# Patient Record
Sex: MALE | Race: WHITE | Employment: FULL TIME | URBAN - METROPOLITAN AREA
[De-identification: names, ages, dates, MRNs, and addresses within clinical notes are randomized per-mention and may not be internally consistent; named-entity substitution may affect disease eponyms.]

---

## 2022-02-09 ENCOUNTER — HOSPITAL ENCOUNTER (EMERGENCY)
Age: 62
Discharge: HOME | End: 2022-02-09
Attending: EMERGENCY MEDICINE
Payer: COMMERCIAL

## 2022-02-09 VITALS
OXYGEN SATURATION: 94 % | HEIGHT: 68 IN | BODY MASS INDEX: 25.91 KG/M2 | HEART RATE: 80 BPM | RESPIRATION RATE: 12 BRPM | TEMPERATURE: 98.3 F | WEIGHT: 171 LBS | DIASTOLIC BLOOD PRESSURE: 52 MMHG | SYSTOLIC BLOOD PRESSURE: 126 MMHG

## 2022-02-09 DIAGNOSIS — T18.128A ESOPHAGEAL OBSTRUCTION DUE TO FOOD IMPACTION: Primary | ICD-10-CM

## 2022-02-09 DIAGNOSIS — T18.128A FOOD IMPACTION OF ESOPHAGUS, INITIAL ENCOUNTER: ICD-10-CM

## 2022-02-09 DIAGNOSIS — K22.2 ESOPHAGEAL OBSTRUCTION DUE TO FOOD IMPACTION: Primary | ICD-10-CM

## 2022-02-09 LAB
A/G RATIO: 1 (ref 1.1–2.2)
ALBUMIN SERPL-MCNC: 4 G/DL (ref 3.4–5)
ALP BLD-CCNC: 101 U/L (ref 40–129)
ALT SERPL-CCNC: 19 U/L (ref 10–40)
ANION GAP SERPL CALCULATED.3IONS-SCNC: 12 MMOL/L (ref 3–16)
AST SERPL-CCNC: 20 U/L (ref 15–37)
BASOPHILS ABSOLUTE: 0.1 K/UL (ref 0–0.2)
BASOPHILS RELATIVE PERCENT: 1 %
BILIRUB SERPL-MCNC: 0.3 MG/DL (ref 0–1)
BUN BLDV-MCNC: 10 MG/DL (ref 7–20)
CALCIUM SERPL-MCNC: 9.2 MG/DL (ref 8.3–10.6)
CHLORIDE BLD-SCNC: 99 MMOL/L (ref 99–110)
CO2: 25 MMOL/L (ref 21–32)
CREAT SERPL-MCNC: 0.9 MG/DL (ref 0.8–1.3)
EOSINOPHILS ABSOLUTE: 0.1 K/UL (ref 0–0.6)
EOSINOPHILS RELATIVE PERCENT: 1.8 %
GFR AFRICAN AMERICAN: >60
GFR NON-AFRICAN AMERICAN: >60
GLUCOSE BLD-MCNC: 110 MG/DL (ref 70–99)
HCT VFR BLD CALC: 44.3 % (ref 40.5–52.5)
HEMOGLOBIN: 14.8 G/DL (ref 13.5–17.5)
LYMPHOCYTES ABSOLUTE: 2.8 K/UL (ref 1–5.1)
LYMPHOCYTES RELATIVE PERCENT: 36.4 %
MCH RBC QN AUTO: 28.2 PG (ref 26–34)
MCHC RBC AUTO-ENTMCNC: 33.4 G/DL (ref 31–36)
MCV RBC AUTO: 84.4 FL (ref 80–100)
MONOCYTES ABSOLUTE: 0.8 K/UL (ref 0–1.3)
MONOCYTES RELATIVE PERCENT: 10.1 %
NEUTROPHILS ABSOLUTE: 4 K/UL (ref 1.7–7.7)
NEUTROPHILS RELATIVE PERCENT: 50.7 %
PDW BLD-RTO: 13.9 % (ref 12.4–15.4)
PLATELET # BLD: 267 K/UL (ref 135–450)
PMV BLD AUTO: 6.8 FL (ref 5–10.5)
POTASSIUM REFLEX MAGNESIUM: 4 MMOL/L (ref 3.5–5.1)
RBC # BLD: 5.25 M/UL (ref 4.2–5.9)
SODIUM BLD-SCNC: 136 MMOL/L (ref 136–145)
TOTAL PROTEIN: 8.2 G/DL (ref 6.4–8.2)
WBC # BLD: 7.8 K/UL (ref 4–11)

## 2022-02-09 PROCEDURE — 88342 IMHCHEM/IMCYTCHM 1ST ANTB: CPT

## 2022-02-09 PROCEDURE — 99152 MOD SED SAME PHYS/QHP 5/>YRS: CPT | Performed by: INTERNAL MEDICINE

## 2022-02-09 PROCEDURE — 88341 IMHCHEM/IMCYTCHM EA ADD ANTB: CPT

## 2022-02-09 PROCEDURE — 7100000010 HC PHASE II RECOVERY - FIRST 15 MIN: Performed by: INTERNAL MEDICINE

## 2022-02-09 PROCEDURE — 2709999900 HC NON-CHARGEABLE SUPPLY: Performed by: INTERNAL MEDICINE

## 2022-02-09 PROCEDURE — 88305 TISSUE EXAM BY PATHOLOGIST: CPT

## 2022-02-09 PROCEDURE — 99284 EMERGENCY DEPT VISIT MOD MDM: CPT

## 2022-02-09 PROCEDURE — 3609012400 HC EGD TRANSORAL BIOPSY SINGLE/MULTIPLE: Performed by: INTERNAL MEDICINE

## 2022-02-09 PROCEDURE — 3609012900 HC EGD FOREIGN BODY REMOVAL: Performed by: INTERNAL MEDICINE

## 2022-02-09 PROCEDURE — 2500000003 HC RX 250 WO HCPCS: Performed by: NURSE PRACTITIONER

## 2022-02-09 PROCEDURE — 2720000010 HC SURG SUPPLY STERILE: Performed by: INTERNAL MEDICINE

## 2022-02-09 PROCEDURE — 88312 SPECIAL STAINS GROUP 1: CPT

## 2022-02-09 PROCEDURE — 99153 MOD SED SAME PHYS/QHP EA: CPT | Performed by: INTERNAL MEDICINE

## 2022-02-09 PROCEDURE — 85025 COMPLETE CBC W/AUTO DIFF WBC: CPT

## 2022-02-09 PROCEDURE — 36415 COLL VENOUS BLD VENIPUNCTURE: CPT

## 2022-02-09 PROCEDURE — 96374 THER/PROPH/DIAG INJ IV PUSH: CPT

## 2022-02-09 PROCEDURE — 80053 COMPREHEN METABOLIC PANEL: CPT

## 2022-02-09 PROCEDURE — 6360000002 HC RX W HCPCS: Performed by: INTERNAL MEDICINE

## 2022-02-09 RX ORDER — FENTANYL CITRATE 50 UG/ML
INJECTION, SOLUTION INTRAMUSCULAR; INTRAVENOUS PRN
Status: DISCONTINUED | OUTPATIENT
Start: 2022-02-09 | End: 2022-02-09 | Stop reason: ALTCHOICE

## 2022-02-09 RX ORDER — DIPHENHYDRAMINE HYDROCHLORIDE 50 MG/ML
INJECTION INTRAMUSCULAR; INTRAVENOUS PRN
Status: DISCONTINUED | OUTPATIENT
Start: 2022-02-09 | End: 2022-02-09 | Stop reason: ALTCHOICE

## 2022-02-09 RX ORDER — PANTOPRAZOLE SODIUM 40 MG/1
40 TABLET, DELAYED RELEASE ORAL
Qty: 30 TABLET | Refills: 0 | Status: ON HOLD | OUTPATIENT
Start: 2022-02-09 | End: 2022-05-13 | Stop reason: SDUPTHER

## 2022-02-09 RX ORDER — MIDAZOLAM HYDROCHLORIDE 1 MG/ML
INJECTION INTRAMUSCULAR; INTRAVENOUS PRN
Status: DISCONTINUED | OUTPATIENT
Start: 2022-02-09 | End: 2022-02-09 | Stop reason: ALTCHOICE

## 2022-02-09 RX ADMIN — GLUCAGON HYDROCHLORIDE 1 MG: KIT at 19:03

## 2022-02-09 ASSESSMENT — PAIN SCALES - GENERAL: PAINLEVEL_OUTOF10: 6

## 2022-02-09 ASSESSMENT — ENCOUNTER SYMPTOMS
CHEST TIGHTNESS: 0
DIARRHEA: 0
TROUBLE SWALLOWING: 1
ABDOMINAL PAIN: 0
SHORTNESS OF BREATH: 0
NAUSEA: 0
VOMITING: 0

## 2022-02-10 NOTE — OP NOTE
Endoscopy Note    Patient: Corby Santana  : 1960  Acct#:     Procedure: Esophagogastroduodenoscopy with biopsy, foreign body removal                         Date:  2022     Surgeon:   Leopoldo Sitter, DO    Referring Physician:  No primary care provider on file. Indications: This is a 62y.o. year old male who presents today with steak esophageal food bolus    Postoperative Diagnosis:  1) Successful removal of esophageal food bolus with snare and traction of bolus through the gastroesophageal junction (38 cm from the incisors). 2) Features of eosinophilic esophagitis were noted. The mid esophagus was biopsied. 3) Large amount of food noted in the body of the stomach. The pylorus and duodenum were not visualized over concerns of patient aspirating gastric contents with overdistension of the stomach    Anesthesia:  Versed 7 mg IV, fentanyl 125 mcg IV, Benadryl 50mg IV    Consent:  The patient or their legal guardian has signed an informed consent, and is aware of the potential risks, benefits, alternatives, and potential complications of this procedure. These include, but are not limited to hemorrhage, bleeding, post procedural pain, perforation, phlebitis, aspiration, hypotension, hypoxia, cardiovascular events such as arryhthmia, and possibly death. Description of Procedure: The patient was then taken to the endoscopy suite, placed in the left lateral decubitus position and the above IV sedation was administrered. The Olympus video endoscope was placed through the patient's oropharynx without difficulty to the extent of the 2nd portion of the duodenum. Both forward and retroflexed views of the stomach were obtained. Findings:    1) There was a large steak food bolus noted in the upper esophagus just distal to the esophageal inlet. Initial attempts at removal with band cap and rivers net were unsuccessful. Partial removal with 15mm stiff snare was performed. Then the food bolus moved distally in the esophagus and was able to be pushed into the stomach through the gastroesophageal junction (38 cm from the incisors). 2) Features of eosinophilic esophagitis were noted. The mid esophagus was biopsied. 3) Large amount of food noted in the body of the stomach. The pylorus and duodenum were not visualized over concerns of patient aspirating gastric contents with overdistension of the stomach    The scope was then withdrawn back into the stomach, it was decompressed, and the scope was completely withdrawn. The patient tolerated the procedure well and was taken to the post anesthesia care unit in good condition. Estimated blood loss: <5ml    ID Type Source Tests Collected by Time Destination   A : esophageal biopsy for eoe Tissue Esophagus SURGICAL PATHOLOGY Prasanth Bullard., DO 2/9/2022 210           Impression:   1) See post procedure diagnoses    Recommendations:   1) Clear liquid diet. Advance diet as tolerated to soft diet  2) Start pantoprazole 40mg daily  3) The patient had biopsies taken today. The patient should call for results in 7 days if they have not heard from our office. 4) Patient should follow up with one of the [address] in [facility] for follow up in 3-4 weeks.          Prasanth Bullard, DO  [address] and [address] [facility]

## 2022-02-10 NOTE — PROGRESS NOTES
S/P egd with foreign body removal and biopsies of esophagus for EOE. Pt drowsy but arousable. Denies pain or discomfort. Pt given versed 7 mg and fentanyl 125 mcg with benadryl 50 mg for sedation for procedure. Tolerated well. Report to ER nurse. Wife to bedside.

## 2022-02-10 NOTE — ED NOTES
Bed: 02  Expected date:   Expected time:   Means of arrival:   Comments:  MARIA TERESA Hastings RN  02/09/22 1919

## 2022-02-10 NOTE — H&P
Pre-operative History and Physical    Patient: Nela Alexander  : 1960  Acct#:     Intended Procedure:  EGD    HISTORY OF PRESENT ILLNESS:  The patient is a 62 y.o. male  who presents for/due to esophageal foreign body    Past Medical History:    History reviewed. No pertinent past medical history. Past Surgical History:        Procedure Laterality Date    APPENDECTOMY      VASECTOMY       Medications Prior to Admission:   No current facility-administered medications on file prior to encounter. Current Outpatient Medications on File Prior to Encounter   Medication Sig Dispense Refill    famotidine (PEPCID) 20 MG tablet Take 1 tablet by mouth 2 times daily 60 tablet 0        Allergies:  Patient has no known allergies. Social History:   TOBACCO:   reports that he has never smoked. He does not have any smokeless tobacco history on file. ETOH:   reports current alcohol use. DRUGS:   reports no history of drug use. PHYSICAL EXAM:      Vital Signs: /80   Pulse 68   Temp 98.3 °F (36.8 °C) (Oral)   Resp 16   Ht 5' 8\" (1.727 m)   Wt 171 lb (77.6 kg)   SpO2 96%   BMI 26.00 kg/m²    Airway: No stridor or wheezing noted. Good air movement  Pulmonary: without wheezes. Clear to auscultation  Cardiac:regular rate and rhythm without loud murmurs  Abdomen:soft, nontender,  Bowel sounds present    Pre-Procedure Assessment / Plan:  1) Esophageal foreign body    ASA Grade:  ASA 2 - Patient with mild systemic disease with no functional limitations  Mallampati Classification:  Class II    Level of Sedation Plan: Moderate sedation    Post Procedure plan: Return to same level of care    I assessed the patient and find that the patient is in satisfactory condition to proceed with the planned procedure and sedation plan. I have explained the risk, benefits, and alternatives to the procedure; the patient understands and agrees to proceed.      The patient was counseled at length about the risks of forrest Covid-19 during their perioperative period and any recovery window from their procedure. The patient was made aware that forrest Covid-19  may worsen their prognosis for recovering from their procedure  and lend to a higher morbidity and/or mortality risk. All material risks, benefits, and reasonable alternatives including postponing the procedure were discussed. The patient does wish to proceed with the procedure at this time.       Briana Shipman DO  2/9/2022

## 2022-02-10 NOTE — ED PROVIDER NOTES
I independently saw performed a substantive portion of the visit (history, physical, and MDM) on Justine Dawson. All diagnostic, treatment, and disposition decisions were made by myself in conjunction with the advanced practice provider. I have participated in the medical decision making and directed the treatment plan and disposition of the patient. For further details of [address] emergency department encounter, please see the advanced practice provider's documentation. CHIEF COMPLAINT  Chief Complaint   Patient presents with    Swallowed Foreign Body     steak stuck in throat for 20 min     Briefly, Justine Dawson is a 62 y.o. male  who presents to the ED complaining of what he describes as a bolus of steak that is stuck in the throat. He does not get stuck in his airway. He has been spitting up his saliva ever since this got stuck there. No history of upper endoscopy or similar esophageal problems in the past.  He did try to stick his fingers in the back of his throat and cough or gag it up but has not been successful in doing so. He denies any chest or belly pain. FOCUSED PHYSICAL EXAMINATION  BP (!) 104/58   Pulse 68   Temp 98.3 °F (36.8 °C) (Oral)   Resp 11   Ht 5' 8\" (1.727 m)   Wt 171 lb (77.6 kg)   SpO2 97%   BMI 26.00 kg/m²    Focused physical examination notable for no acute distress, well-appearing, well-nourished, normal speech and mentation without obvious facial droop, no obvious rash. No obvious cranial nerve deficits on my initial exam.  Trachea midline, no stridor. He is coughing and gagging trying to get this taken up and points to his sternal notch when asked where he thinks it is. He denies any abdominal pain with palpation, no distention, regular rate and rhythm, clear to auscultation bilaterally. He is not drooling and does not have abnormal phonation but is not able to swallow his secretions without spitting them into a bag.     MDM:  ED course was notable for esophageal food impaction. GI was consulted to come in for endoscopic evaluation based on the patient's presentation. Does not appear to be an airway aspiration at this point. Dr. Sharif Valerio from GI was consulted about the patient's ED history, physical, workup, and course so far. Recommendations from this consultant included successful endoscopic evaluation in the ED. A steak bolus was pushed down and the patient apparently exhibited symptoms and signs of eosinophilic esophageal inflammation. As such the patient was started on a PPI and told to follow-up with GI in several weeks as per their recommendation with a soft mechanical diet in the meantime. During the patient's ED course, the patient was given:  Medications   midazolam (VERSED) injection (1 mg IntraVENous Given 2/9/22 2057)   fentaNYL (SUBLIMAZE) injection (25 mcg IntraVENous Given 2/9/22 2057)   diphenhydrAMINE (BENADRYL) injection (50 mg IntraVENous Given 2/9/22 2041)   glucagon (rDNA) injection 1 mg (1 mg IntraVENous Given 2/9/22 1903)        CLINICAL IMPRESSION  1. Esophageal obstruction due to food impaction        DISPOSITION  Higinio Cochran was discharged to home in stable condition. I have discussed the findings of today's workup with the patient and addressed the patient's questions and concerns. Important warning signs as well as new or worsening symptoms which would necessitate immediate return to the ED were discussed. The plan is to discharge from the ED at this time, and the patient is in stable condition. The patient acknowledged understanding is agreeable with this plan. Patient was given scripts for the following medications. I counseled patient how to take these medications.    New Prescriptions    PANTOPRAZOLE (PROTONIX) 40 MG TABLET    Take 1 tablet by mouth every morning (before breakfast)     Follow-up with:  Mely Gonzalez., DO [facility] [place]  [phone]    Schedule an appointment as soon as possible for a visit in 2 weeks  For symptom re-evaluation    [facility] Emergency Department  [address] [facility]  [address] [facility] [address] [facility]  Go to   If symptoms worsen      This chart was created using Dragon dictation software. Efforts were made by me to ensure accuracy, however some errors may be present due to limitations of this technology.             Abhi Rivera MD  02/09/22 5072

## 2022-02-10 NOTE — ED PROVIDER NOTES
[address]        Pt Name: Camilo Morfin  MRN: [id number]  [place] 1960  Date of evaluation: 2/9/2022  Provider: GILBERT Verduzco CNP  PCP: No primary care provider on file. Note Started: 7:19 PM EST        I have seen and evaluated this patient with my supervising physician Marina Ayoub MD.    CHIEF COMPLAINT       Chief Complaint   Patient presents with    Swallowed Foreign Body     steak stuck in throat for 20 min       HISTORY OF PRESENT ILLNESS   (Location, Timing/Onset, Context/Setting, Quality, Duration, Modifying Factors, Severity, Associated Signs and Symptoms)  Note limiting factors. Chief Complaint: Food bolus in esophagus    Camilo Morfin is a 62 y.o. male who presents to the emergency department complaining of a food bolus in his esophagus. Patient reports that he was eating steak about 20 minutes prior to arrival when he feels as if there is a piece of food stuck. He is unable to clear secretions. No difficulty breathing. The patient is spitting his saliva does appear uncomfortable. States that he tried to [employer] in and get it\" unsuccessfully. He denies history of previous event. Patient reports that he had 4 beers today while shoveling snow, he is a daily drinker. States that he drinks less than a sixpack daily. Only takes a multivitamin each day    Denies any headache, fever, lightheadedness, dizziness, visual disturbances. No chest pain or pressure. No neck or back pain. No shortness of breath, cough, or congestion. No abdominal pain, diarrhea, constipation, or dysuria. No rash. Nursing Notes were all reviewed and agreed with or any disagreements were addressed in the HPI. REVIEW OF SYSTEMS    (2-9 systems for level 4, 10 or more for level 5)     Review of Systems   Constitutional: Negative for activity change, chills and fever.    HENT: Positive for trouble swallowing. Respiratory: Negative for chest tightness and shortness of breath. Cardiovascular: Negative for chest pain. Gastrointestinal: Negative for abdominal pain, diarrhea, nausea and vomiting. Genitourinary: Negative for dysuria. All other systems reviewed and are negative. Positives and Pertinent negatives as per HPI. Except as noted above in the ROS, all other systems were reviewed and negative. PAST MEDICAL HISTORY   History reviewed. No pertinent past medical history. SURGICAL HISTORY     Past Surgical History:   Procedure Laterality Date    APPENDECTOMY      VASECTOMY           CURRENTMEDICATIONS       Previous Medications    No medications on file         ALLERGIES     Patient has no known allergies. FAMILYHISTORY     History reviewed. No pertinent family history. SOCIAL HISTORY       Social History     Tobacco Use    Smoking status: Never Smoker    Smokeless tobacco: Not on file   Substance Use Topics    Alcohol use: Yes     Comment: social     Drug use: No       SCREENINGS    [place] Coma Scale  Eye Opening: Spontaneous  Best Verbal Response: Oriented  Best Motor Response: Obeys commands  [place] Coma Scale Score: 15        PHYSICAL EXAM    (up to 7 for level 4, 8 or more for level 5)     ED Triage Vitals [02/09/22 1857]   BP Temp Temp Source Pulse Resp SpO2 Height Weight   135/88 98.3 °F (36.8 °C) Oral 86 18 100 % 5' 8\" (1.727 m) 171 lb (77.6 kg)       Physical Exam  Vitals and nursing note reviewed. Constitutional:       Appearance: He is well-developed. He is not diaphoretic. HENT:      Head: Normocephalic and atraumatic. Right Ear: External ear normal.      Left Ear: External ear normal.   Eyes:      General:         Right eye: No discharge. Left eye: No discharge. Neck:      Vascular: No JVD. Cardiovascular:      Rate and Rhythm: Normal rate and regular rhythm. Pulses: Normal pulses. Heart sounds: Normal heart sounds. Pulmonary:      Effort: Pulmonary effort is normal. No respiratory distress. Breath sounds: Normal breath sounds. Abdominal:      Palpations: Abdomen is soft. Musculoskeletal:         General: Normal range of motion. Cervical back: Normal range of motion and neck supple. Skin:     General: Skin is warm and dry. Coloration: Skin is not pale. Neurological:      Mental Status: He is alert and oriented to person, place, and time. Psychiatric:         Behavior: Behavior normal.         DIAGNOSTIC RESULTS   LABS:    Labs Reviewed   COMPREHENSIVE METABOLIC PANEL W/ REFLEX TO MG FOR LOW K - Abnormal; Notable for the following components:       Result Value    Glucose 110 (*)     Albumin/Globulin Ratio 1.0 (*)     All other components within normal limits    Narrative:     Performed at:  [facility] [address], [facility], [address]   Phone [phone]   CBC WITH AUTO DIFFERENTIAL    Narrative:     Performed at:  [facility]  [address] [facility], [address]   Phone [phone]   SURGICAL PATHOLOGY       When ordered only abnormal lab results are displayed. All other labs were within normal range or not returned as of this dictation. EKG: When ordered, EKG's are interpreted by the Emergency Department Physician in the absence of a cardiologist.  Please see their note for interpretation of EKG. RADIOLOGY:   Non-plain film images such as CT, Ultrasound and MRI are read by the radiologist. Plain radiographic images are visualized and preliminarily interpreted by the ED Provider with the below findings:        Interpretation per the Radiologist below, if available at the time of this note:    No orders to display     No results found.         PROCEDURES   Unless otherwise noted below, none     Procedures    CRITICAL CARE TIME       CONSULTS:  IP CONSULT TO GI      EMERGENCY DEPARTMENT COURSE and DIFFERENTIAL DIAGNOSIS/MDM:   Vitals:    Vitals:    02/09/22 2108 02/09/22 2109 02/09/22 2110 02/09/22 2115   BP:  (!) 104/58  (!) 126/52   Pulse: 70 69 68 80   Resp: 12 12 11 12   Temp:       TempSrc:       SpO2: 97% 97% 97% 94%   Weight:       Height:           Patient was given the following medications:  Medications   glucagon (rDNA) injection 1 mg (1 mg IntraVENous Given 2/9/22 1903)           Briefly, this is a 61-year-old male with food bolus in his esophagus. Onset of symptoms about 20 minutes prior to arrival.    Patient reports that he has never had food stuck previously. He has never had an EGD or dilatation of the esophagus. Dr. Robbie Canela, GI, consulted. Will gather a team and come to the bedside for bedside endoscopic. CBC and CMP are unremarkable. Bedside endoscopy completed. The patient did awaken without incident. Started on a PPI and instructed to follow-up with GI in the office in a couple of weeks. Written instructions were given. Patient was instructed not to drive or make important decisions over the next 24 hours due to procedural sedation    I discussed with the patient and/or family that although no further foreign bodies or foreign matter were found in the wound after extensive, careful wound exploration, foreign bodies or matter can escape detection. Follow-up wound care was discussed, as well as instructing the patient that follow-up should be obtained if any foreign body sensation is present, or if signs of infection (redness, increasing pain, swelling, drainage) develop. FINAL IMPRESSION      1. Esophageal obstruction due to food impaction    2.  Food impaction of esophagus, initial encounter          DISPOSITION/PLAN   DISPOSITION Decision To Discharge 02/09/2022 10:24:37 PM      PATIENT REFERRED TO:  Sallie Nieto., DO [facility]  [phone]    Schedule an appointment as soon as possible for a visit in 2 weeks  For symptom re-evaluation    [facility] [address] Emergency Department  [facility] [address]  [phone]  Go to   If symptoms worsen      DISCHARGE MEDICATIONS:  New Prescriptions    PANTOPRAZOLE (PROTONIX) 40 MG TABLET    Take 1 tablet by mouth every morning (before breakfast)       DISCONTINUED MEDICATIONS:  Discontinued Medications    FAMOTIDINE (PEPCID) 20 MG TABLET    Take 1 tablet by mouth 2 times daily              (Please note that portions of this note were completed with a voice recognition program.  Efforts were made to edit the dictations but occasionally words are mis-transcribed.)    GILBERT Montalvo CNP (electronically signed)           GILBERT Montalvo CNP  02/09/22 4762

## 2022-02-10 NOTE — ED NOTES
Discharge instructions given at bedside and pt verbalized understanding via teach-back. AAOx4, with steady gait and ambulatory. NAD, respirations even and unlabored.       Berkley Paredes RN  02/09/22 6525

## 2022-02-10 NOTE — ED NOTES
Consent obtained by EGD RN's. EGD team at bedside now for procedure.       Ciara Gallo RN  02/09/22 2036

## 2022-05-05 NOTE — PROGRESS NOTES
Patient ___ reached   __X___not reached-preop instructions left on voice xpcp__[phone]___________      RAIR___[id number]_____ TIME____1010____ARRIVAL____0840_____      Nothing to eat or drink after midnight the night before,except for what the prep instructions call for. If you do not have the instructions or do not understand them please contact your doctors office. Follow any instructions your doctors office has given you including what medications to take the AM of your procedure and which ones to hold. You may use your inhalers. If you take a long acting insulin the eulogio prior please cut the dose in half and take no diabetic medications that AM.Follow specific doctors office instructions regarding blood thinners and if they want you to hold and for how long. If you are on a blood thinner and have no instructions please contact the office and ask. Dress comfortably,bring your insurance card,picture ID,and a complete list of medications, including supplements. You must have a responsible adult to stay with you during the procedure,drive you home and stay with you. [facility] phone number [phone] for any questions. VISITOR POLICY(subject to change)    Current visitor policy is 2 visitors per patient. No children allowed. Mask are required. Visiting hours are 8a-8p. Overnight visitors will be at the discretion of the nurse.

## 2022-05-13 ENCOUNTER — ANESTHESIA EVENT (OUTPATIENT)
Dept: ENDOSCOPY | Age: 62
End: 2022-05-13
Payer: COMMERCIAL

## 2022-05-13 ENCOUNTER — HOSPITAL ENCOUNTER (OUTPATIENT)
Age: 62
Setting detail: OUTPATIENT SURGERY
Discharge: HOME HEALTH | End: 2022-05-13
Attending: INTERNAL MEDICINE | Admitting: INTERNAL MEDICINE
Payer: COMMERCIAL

## 2022-05-13 ENCOUNTER — ANESTHESIA (OUTPATIENT)
Dept: ENDOSCOPY | Age: 62
End: 2022-05-13
Payer: COMMERCIAL

## 2022-05-13 VITALS
RESPIRATION RATE: 16 BRPM | DIASTOLIC BLOOD PRESSURE: 81 MMHG | BODY MASS INDEX: 25.48 KG/M2 | HEART RATE: 64 BPM | TEMPERATURE: 97 F | HEIGHT: 69 IN | OXYGEN SATURATION: 96 % | SYSTOLIC BLOOD PRESSURE: 128 MMHG | WEIGHT: 172 LBS

## 2022-05-13 VITALS
RESPIRATION RATE: 20 BRPM | DIASTOLIC BLOOD PRESSURE: 93 MMHG | OXYGEN SATURATION: 99 % | SYSTOLIC BLOOD PRESSURE: 135 MMHG

## 2022-05-13 PROCEDURE — 88305 TISSUE EXAM BY PATHOLOGIST: CPT

## 2022-05-13 PROCEDURE — 2580000003 HC RX 258: Performed by: ANESTHESIOLOGY

## 2022-05-13 PROCEDURE — 3700000000 HC ANESTHESIA ATTENDED CARE: Performed by: INTERNAL MEDICINE

## 2022-05-13 PROCEDURE — 88104 CYTOPATH FL NONGYN SMEARS: CPT

## 2022-05-13 PROCEDURE — 6360000002 HC RX W HCPCS: Performed by: NURSE ANESTHETIST, CERTIFIED REGISTERED

## 2022-05-13 PROCEDURE — 2500000003 HC RX 250 WO HCPCS: Performed by: NURSE ANESTHETIST, CERTIFIED REGISTERED

## 2022-05-13 PROCEDURE — C1726 CATH, BAL DIL, NON-VASCULAR: HCPCS | Performed by: INTERNAL MEDICINE

## 2022-05-13 PROCEDURE — 3609017700 HC EGD DILATION GASTRIC/DUODENAL STRICTURE: Performed by: INTERNAL MEDICINE

## 2022-05-13 PROCEDURE — 2709999900 HC NON-CHARGEABLE SUPPLY: Performed by: INTERNAL MEDICINE

## 2022-05-13 PROCEDURE — 88112 CYTOPATH CELL ENHANCE TECH: CPT

## 2022-05-13 PROCEDURE — 88312 SPECIAL STAINS GROUP 1: CPT

## 2022-05-13 PROCEDURE — 7100000010 HC PHASE II RECOVERY - FIRST 15 MIN: Performed by: INTERNAL MEDICINE

## 2022-05-13 PROCEDURE — 3700000001 HC ADD 15 MINUTES (ANESTHESIA): Performed by: INTERNAL MEDICINE

## 2022-05-13 PROCEDURE — 3609012400 HC EGD TRANSORAL BIOPSY SINGLE/MULTIPLE: Performed by: INTERNAL MEDICINE

## 2022-05-13 PROCEDURE — 7100000011 HC PHASE II RECOVERY - ADDTL 15 MIN: Performed by: INTERNAL MEDICINE

## 2022-05-13 RX ORDER — LIDOCAINE HYDROCHLORIDE 20 MG/ML
INJECTION, SOLUTION EPIDURAL; INFILTRATION; INTRACAUDAL; PERINEURAL PRN
Status: DISCONTINUED | OUTPATIENT
Start: 2022-05-13 | End: 2022-05-13 | Stop reason: SDUPTHER

## 2022-05-13 RX ORDER — SODIUM CHLORIDE 9 MG/ML
INJECTION, SOLUTION INTRAVENOUS CONTINUOUS
Status: DISCONTINUED | OUTPATIENT
Start: 2022-05-13 | End: 2022-05-13 | Stop reason: HOSPADM

## 2022-05-13 RX ORDER — PROPOFOL 10 MG/ML
INJECTION, EMULSION INTRAVENOUS PRN
Status: DISCONTINUED | OUTPATIENT
Start: 2022-05-13 | End: 2022-05-13 | Stop reason: SDUPTHER

## 2022-05-13 RX ORDER — M-VIT,TX,IRON,MINS/CALC/FOLIC 27MG-0.4MG
1 TABLET ORAL DAILY
COMMUNITY

## 2022-05-13 RX ORDER — PANTOPRAZOLE SODIUM 40 MG/1
40 TABLET, DELAYED RELEASE ORAL
Qty: 30 TABLET | Refills: 5 | Status: SHIPPED | OUTPATIENT
Start: 2022-05-13

## 2022-05-13 RX ORDER — GLYCOPYRROLATE 0.2 MG/ML
INJECTION INTRAMUSCULAR; INTRAVENOUS PRN
Status: DISCONTINUED | OUTPATIENT
Start: 2022-05-13 | End: 2022-05-13 | Stop reason: SDUPTHER

## 2022-05-13 RX ADMIN — LIDOCAINE HYDROCHLORIDE 80 MG: 20 INJECTION, SOLUTION EPIDURAL; INFILTRATION; INTRACAUDAL; PERINEURAL at 09:58

## 2022-05-13 RX ADMIN — GLYCOPYRROLATE 0.2 MG: 0.2 INJECTION, SOLUTION INTRAMUSCULAR; INTRAVENOUS at 09:56

## 2022-05-13 RX ADMIN — PROPOFOL 50 MG: 10 INJECTION, EMULSION INTRAVENOUS at 10:10

## 2022-05-13 RX ADMIN — PROPOFOL 5 MG: 10 INJECTION, EMULSION INTRAVENOUS at 10:06

## 2022-05-13 RX ADMIN — SODIUM CHLORIDE: 9 INJECTION, SOLUTION INTRAVENOUS at 09:09

## 2022-05-13 RX ADMIN — PROPOFOL 40 MG: 10 INJECTION, EMULSION INTRAVENOUS at 10:02

## 2022-05-13 RX ADMIN — PROPOFOL 160 MG: 10 INJECTION, EMULSION INTRAVENOUS at 09:58

## 2022-05-13 RX ADMIN — LIDOCAINE HYDROCHLORIDE 20 MG: 20 INJECTION, SOLUTION EPIDURAL; INFILTRATION; INTRACAUDAL; PERINEURAL at 10:02

## 2022-05-13 ASSESSMENT — LIFESTYLE VARIABLES: SMOKING_STATUS: 0

## 2022-05-13 ASSESSMENT — PAIN - FUNCTIONAL ASSESSMENT: PAIN_FUNCTIONAL_ASSESSMENT: NONE - DENIES PAIN

## 2022-05-13 NOTE — ANESTHESIA POSTPROCEDURE EVALUATION
Department of Anesthesiology  Postprocedure Note    Patient: Bhavin Patterson  MRN: [id number]  YOB: 1960  Date of evaluation: 5/13/2022  Time:  10:29 AM     Procedure Summary     Date: 05/13/22 Room / Location: [address] [place] [address] / [facility]    Anesthesia Start: 4119 Anesthesia Stop: 5351    Procedures:       EGD BIOPSY (N/A Abdomen)      EGD DILATION BALLOON (N/A Abdomen) Diagnosis: (DYSPHAGIA, SCHEDULED EGD R13.10)    Surgeons: Arslan Alarcon MD Responsible Provider: Conchita Quiñonez MD    Anesthesia Type: MAC ASA Status: 2          Anesthesia Type: No value filed. Tony Phase I: Tony Score: 10    Tony Phase II: Tony Score: 9    Last vitals: Reviewed and per EMR flowsheets.        Anesthesia Post Evaluation    Patient location during evaluation: PACU  Patient participation: complete - patient participated  Level of consciousness: awake and alert  Airway patency: patent  Nausea & Vomiting: no vomiting and no nausea  Complications: no  Cardiovascular status: hemodynamically stable  Respiratory status: acceptable  Hydration status: stable

## 2022-05-13 NOTE — H&P
[address] [place] [address]   Pre-operative History and Physical    Patient: Delores Barajas  : 1960  CSN:     History Obtained From: patient and/or guardian. HISTORY OF PRESENT ILLNESS:    The patient is a 65 y.o. male with dysphagia and history of EGD for extraction of meat bolus impaction of the esophagus, here for EGD. Past Medical History:        Diagnosis Date    Psoriasis      Past Surgical History:        Procedure Laterality Date    APPENDECTOMY      UPPER GASTROINTESTINAL ENDOSCOPY N/A 2022    EGD FOREIGN BODY REMOVAL performed by Bruna Jacobsen DO at [address] [place] [address] N/A 2022    EGD BIOPSY performed by Bruna Jacobsen DO at [facility]       Medications Prior to Admission:   No current facility-administered medications on file prior to encounter. Current Outpatient Medications on File Prior to Encounter   Medication Sig Dispense Refill    Multiple Vitamins-Minerals (THERAPEUTIC MULTIVITAMIN-MINERALS) tablet Take 1 tablet by mouth daily      Ixekizumab (TALTZ SC) Inject into the skin every 30 days      Omega-3 Fatty Acids (FISH OIL PO) Take by mouth daily      Apoaequorin (PREVAGEN PO) Take by mouth daily      pantoprazole (PROTONIX) 40 MG tablet Take 1 tablet by mouth every morning (before breakfast) (Patient not taking: Reported on 2022) 30 tablet 0    [DISCONTINUED] famotidine (PEPCID) 20 MG tablet Take 1 tablet by mouth 2 times daily 60 tablet 0     Allergies:  Patient has no known allergies. Social History:   Social History     Tobacco Use    Smoking status: Never Smoker    Smokeless tobacco: Never Used   Substance Use Topics    Alcohol use: Yes     Comment: social      Family History:   History reviewed. No pertinent family history.     PHYSICAL EXAM:      /79   Pulse 53   Temp 97.7 °F (36.5 °C) (Temporal)   Resp 16   Ht 5' 9\" (1.753 m)   Wt 172 lb (78 kg) SpO2 99%   BMI 25.40 kg/m²  I        Heart:  RRR,  Normal S1S2    Lungs:  CTA Bilat, normal effort    Abdomen:  ND NT      ASA Grade:  ASA 2 - Patient with mild systemic disease with no functional limitations    Mallampati Class:  Class I: Soft palate, uvula, fauces, pillars visible  __________  Class II: Soft palate, uvula, fauces visible  ____X_____   Class III: Soft palate, base of uvula visible  __________  Class IV: Hard palate only visible   __________      ASSESSMENT AND PLAN:    1. Patient is a 65 y.o. male here for EGD with anesthesia  2. Procedure options, risks and benefits reviewed with patient. Patient expresses understanding.      Virginia Polk, [address] [place] [address]  5/13/2022

## 2022-05-13 NOTE — BRIEF OP NOTE
Brief Postoperative Note - Full Note in Chart Review/Procedures tab       Patient: Kelsea Maloney  YOB: 1960  MRN: [id number]    Date of Procedure: 5/13/2022    Pre-Op Diagnosis: DYSPHAGIA, SCHEDULED EGD R13.10    Post-Op Diagnosis: Same       Procedure(s):  EGD BIOPSY  EGD DILATION BALLOON    Surgeon(s):  Mayco Alan MD    Assistant:  * No surgical staff found *    Anesthesia: Monitor Anesthesia Care    Estimated Blood Loss (mL): Minimal    Complications: None    Specimens:   ID Type Source Tests Collected by Time Destination   1 : esophagus brushing for candida s3opbaw tip, x2 slides Tissue Esophagus CYTOLOGY, NON-GYN Ric Avelar RN 5/13/2022 1005    A : bx esophagus for r/o EE Tissue Esophagus SURGICAL PATHOLOGY Ric Avelar RN 5/13/2022 1008        Implants:  * No implants in log *      Drains: * No LDAs found *    Findings:  1) Adherent large and small exudates in esophagus - brushed and bx'ed to r/o Candida and eosinophilic esophagitis    2) The upper and lower esophagus were empirically dilated with a 20 mm balloon - small mucosal tears seen after dilation.     3) Normal stomach and duodenum    Rec:  Antireflux diet  Protonix 40 mg PO BID  Follow up in office in 3 - 4 months  Follow up with primary care physician  Await biopsies - patient to call office in 2 weeks for results    Electronically signed by Mayco Alan MD on 5/13/2022 at 10:13 AM

## 2022-05-13 NOTE — ANESTHESIA PRE PROCEDURE
Department of Anesthesiology  Preprocedure Note       Name:  Blanca Monroe   Age:  65 y.o.  :  1960                                          MRN:  [id number]         Date:  2022      Surgeon: Kaiser Manriquez):  Joannie Osler, MD    Procedure: Procedure(s):  EGD DIAGNOSTIC ONLY    Medications prior to admission:   Prior to Admission medications    Medication Sig Start Date End Date Taking? Authorizing Provider   pantoprazole (PROTONIX) 40 MG tablet Take 1 tablet by mouth every morning (before breakfast) 22   Gage Rodriguez MD   famotidine (PEPCID) 20 MG tablet Take 1 tablet by mouth 2 times daily 10/14/16 2/9/22  DANIELLE Cazares       Current medications:    No current facility-administered medications for this encounter. Allergies:  No Known Allergies    Problem List:  There is no problem list on file for this patient. Past Medical History:  No past medical history on file. Past Surgical History:        Procedure Laterality Date    APPENDECTOMY      UPPER GASTROINTESTINAL ENDOSCOPY N/A 2022    EGD FOREIGN BODY REMOVAL performed by Felecia Winston DO at [address] [place] [address] N/A 2022    EGD BIOPSY performed by Felecia Winston DO at [facility]         Social History:    Social History     Tobacco Use    Smoking status: Never Smoker    Smokeless tobacco: Not on file   Substance Use Topics    Alcohol use: Yes     Comment: social                                 Counseling given: Not Answered      Vital Signs (Current): There were no vitals filed for this visit.                                            BP Readings from Last 3 Encounters:   22 (!) 126/52   10/14/16 119/64       NPO Status:                                                                                 BMI:   Wt Readings from Last 3 Encounters:   22 171 lb (77.6 kg)   10/14/16 170 lb (77.1 kg)     There is no height or weight on file to calculate BMI.    CBC:   Lab Results   Component Value Date    WBC 7.8 02/09/2022    RBC 5.25 02/09/2022    HGB 14.8 02/09/2022    HCT 44.3 02/09/2022    MCV 84.4 02/09/2022    RDW 13.9 02/09/2022     02/09/2022       CMP:   Lab Results   Component Value Date     02/09/2022    K 4.0 02/09/2022    CL 99 02/09/2022    CO2 25 02/09/2022    BUN 10 02/09/2022    CREATININE 0.9 02/09/2022    GFRAA >60 02/09/2022    AGRATIO 1.0 02/09/2022    LABGLOM >60 02/09/2022    GLUCOSE 110 02/09/2022    PROT 8.2 02/09/2022    CALCIUM 9.2 02/09/2022    BILITOT 0.3 02/09/2022    ALKPHOS 101 02/09/2022    AST 20 02/09/2022    ALT 19 02/09/2022       POC Tests: No results for input(s): POCGLU, POCNA, POCK, POCCL, POCBUN, POCHEMO, POCHCT in the last 72 hours.     Coags: No results found for: PROTIME, INR, APTT    HCG (If Applicable): No results found for: PREGTESTUR, PREGSERUM, HCG, HCGQUANT     ABGs: No results found for: PHART, PO2ART, JAA9BQP, BMZ5IJJ, BEART, Q2UPNCJY     Type & Screen (If Applicable):  No results found for: LABABO, LABRH    Drug/Infectious Status (If Applicable):  No results found for: HIV, HEPCAB    COVID-19 Screening (If Applicable): No results found for: COVID19        Anesthesia Evaluation  Patient summary reviewed and Nursing notes reviewed no history of anesthetic complications:   Airway: Mallampati: II  TM distance: >3 FB   Neck ROM: full  Mouth opening: > = 3 FB Dental:    (+) upper dentures and lower dentures      Pulmonary:Negative Pulmonary ROS and normal exam  breath sounds clear to auscultation      (-) COPD, asthma, sleep apnea and not a current smoker                           Cardiovascular:Negative CV ROS        (-) hypertension, past MI, CAD, CABG/stent, dysrhythmias,  angina and  CHF      Rhythm: regular  Rate: normal                    Neuro/Psych:   Negative Neuro/Psych ROS     (-) seizures, TIA and CVA           GI/Hepatic/Renal:   (+) GERD:,      (-) liver disease and no renal disease       Endo/Other: Negative Endo/Other ROS       (-) diabetes mellitus, hypothyroidism, hyperthyroidism               Abdominal:             Vascular: Other Findings:             Anesthesia Plan      MAC     ASA 2       Induction: intravenous. Anesthetic plan and risks discussed with patient. Plan discussed with CRNA.                   Janae Whitfield MD   5/13/2022

## 2023-08-09 NOTE — PROGRESS NOTES
Patient Reached: ___ Yes  _X__ No    Left Voice Message with Instructions: _X__ Yes  ___ No      Date: 8/14/2023      Times:  12:30 Procedure   11:00 Arrival      Location:  [address] [place] [address] Dr. [place], [place]      -Nothing to eat or drink after midnight the night before,except for what the prep instructions call for.   -If you do not have the instructions or do not understand them please contact your doctors office.    -Follow any instructions your doctors office has given you including what medications to take the AM of your procedure and which ones to hold. You may use your inhalers - bring rescue inhalers with you DOS. If you take a long acting insulin the eulogio prior please cut the dose in half and take no diabetic medications that AM.Follow specific doctors office instructions regarding blood thinners and if they want you to hold and for how long. If you are on a blood thinner and have no instructions please contact the office and ask.    -Dress comfortably,bring your insurance card,picture ID,and a complete list of medications, including supplements. -You must have a responsible adult to stay with you during the procedure,drive you home and stay with you. -[facility] phone number [phone] for any questions. OTHER INSTRUCTIONS(if applicable)_________________________________________________________        VISITOR POLICY(subject to change)    Current visitor policy is 2 visitors per patient. No children allowed. Mask at discretion of facility. Visiting hours are 8a-8p. Overnight visitors will be at the discretion of the nurse. All policies are subject to change.

## 2023-08-11 ENCOUNTER — ANESTHESIA EVENT (OUTPATIENT)
Dept: ENDOSCOPY | Age: 63
End: 2023-08-11
Payer: COMMERCIAL

## 2023-08-14 ENCOUNTER — ANESTHESIA (OUTPATIENT)
Dept: ENDOSCOPY | Age: 63
End: 2023-08-14
Payer: COMMERCIAL

## 2023-08-14 ENCOUNTER — HOSPITAL ENCOUNTER (OUTPATIENT)
Age: 63
Setting detail: OUTPATIENT SURGERY
Discharge: HOME | End: 2023-08-14
Attending: INTERNAL MEDICINE | Admitting: INTERNAL MEDICINE
Payer: COMMERCIAL

## 2023-08-14 VITALS
HEIGHT: 67 IN | WEIGHT: 170 LBS | TEMPERATURE: 97 F | RESPIRATION RATE: 16 BRPM | DIASTOLIC BLOOD PRESSURE: 82 MMHG | OXYGEN SATURATION: 97 % | SYSTOLIC BLOOD PRESSURE: 126 MMHG | BODY MASS INDEX: 26.68 KG/M2 | HEART RATE: 53 BPM

## 2023-08-14 DIAGNOSIS — R13.10 DYSPHAGIA, UNSPECIFIED TYPE: ICD-10-CM

## 2023-08-14 PROCEDURE — 7100000011 HC PHASE II RECOVERY - ADDTL 15 MIN: Performed by: INTERNAL MEDICINE

## 2023-08-14 PROCEDURE — 3609012400 HC EGD TRANSORAL BIOPSY SINGLE/MULTIPLE: Performed by: INTERNAL MEDICINE

## 2023-08-14 PROCEDURE — C1726 CATH, BAL DIL, NON-VASCULAR: HCPCS | Performed by: INTERNAL MEDICINE

## 2023-08-14 PROCEDURE — 2580000003 HC RX 258: Performed by: INTERNAL MEDICINE

## 2023-08-14 PROCEDURE — 2580000003 HC RX 258: Performed by: NURSE ANESTHETIST, CERTIFIED REGISTERED

## 2023-08-14 PROCEDURE — 2500000003 HC RX 250 WO HCPCS: Performed by: NURSE ANESTHETIST, CERTIFIED REGISTERED

## 2023-08-14 PROCEDURE — 7100000010 HC PHASE II RECOVERY - FIRST 15 MIN: Performed by: INTERNAL MEDICINE

## 2023-08-14 PROCEDURE — 3700000000 HC ANESTHESIA ATTENDED CARE: Performed by: INTERNAL MEDICINE

## 2023-08-14 PROCEDURE — 2709999900 HC NON-CHARGEABLE SUPPLY: Performed by: INTERNAL MEDICINE

## 2023-08-14 PROCEDURE — 6360000002 HC RX W HCPCS: Performed by: NURSE ANESTHETIST, CERTIFIED REGISTERED

## 2023-08-14 PROCEDURE — 3609017700 HC EGD DILATION GASTRIC/DUODENAL STRICTURE: Performed by: INTERNAL MEDICINE

## 2023-08-14 RX ORDER — PROPOFOL 10 MG/ML
INJECTION, EMULSION INTRAVENOUS PRN
Status: DISCONTINUED | OUTPATIENT
Start: 2023-08-14 | End: 2023-08-14 | Stop reason: SDUPTHER

## 2023-08-14 RX ORDER — SODIUM CHLORIDE 9 MG/ML
INJECTION, SOLUTION INTRAVENOUS CONTINUOUS
Status: DISCONTINUED | OUTPATIENT
Start: 2023-08-14 | End: 2023-08-14 | Stop reason: HOSPADM

## 2023-08-14 RX ORDER — LIDOCAINE HYDROCHLORIDE 20 MG/ML
INJECTION, SOLUTION EPIDURAL; INFILTRATION; INTRACAUDAL; PERINEURAL PRN
Status: DISCONTINUED | OUTPATIENT
Start: 2023-08-14 | End: 2023-08-14 | Stop reason: SDUPTHER

## 2023-08-14 RX ORDER — SODIUM CHLORIDE 9 MG/ML
INJECTION, SOLUTION INTRAVENOUS CONTINUOUS PRN
Status: DISCONTINUED | OUTPATIENT
Start: 2023-08-14 | End: 2023-08-14 | Stop reason: SDUPTHER

## 2023-08-14 RX ADMIN — SODIUM CHLORIDE: 9 INJECTION, SOLUTION INTRAVENOUS at 11:17

## 2023-08-14 RX ADMIN — PROPOFOL 100 MG: 10 INJECTION, EMULSION INTRAVENOUS at 12:26

## 2023-08-14 RX ADMIN — LIDOCAINE HYDROCHLORIDE 100 MG: 20 INJECTION, SOLUTION EPIDURAL; INFILTRATION; INTRACAUDAL; PERINEURAL at 12:23

## 2023-08-14 RX ADMIN — PROPOFOL 100 MG: 10 INJECTION, EMULSION INTRAVENOUS at 12:30

## 2023-08-14 RX ADMIN — PROPOFOL 100 MG: 10 INJECTION, EMULSION INTRAVENOUS at 12:23

## 2023-08-14 RX ADMIN — SODIUM CHLORIDE: 9 INJECTION, SOLUTION INTRAVENOUS at 11:55

## 2023-08-14 ASSESSMENT — PAIN SCALES - GENERAL
PAINLEVEL_OUTOF10: 0

## 2023-08-14 ASSESSMENT — PAIN - FUNCTIONAL ASSESSMENT: PAIN_FUNCTIONAL_ASSESSMENT: NONE - DENIES PAIN

## 2023-08-14 NOTE — DISCHARGE INSTRUCTIONS
EGD DISCHARGE INSTRUCTIONS    Use salt water gargle, lozenges, or Chloraseptic spray as needed for sore throat. If you have fever, chills, excessive bleeding, severe chest pain, or abdominal pain, or any other problems, contact your physician's office immediately at [phone]. If you had an esophageal dilatation, and experience fever, chills, excessive bleeding, shortness of breath, chest or abdominal pain, or any other unusual symptom, call the office immediately at [phone]. Continue home medications as directed. Call physician's office in five to seven business days for biopsy results or further instructions. Call your physician at [phone] for a follow up appointment in ______________    You need another EGD in _______________________________    See your physician's report for procedure details and recommendations. ANESTHESIA DISCHARGE INSTRUCTIONS    Wear your seatbelt home. A responsible adult needs to be with you for 24 hours  You are under the influence of drugs-do not drink alcohol, drive ,operate machinery,or make any important decisions or sign any legal documentsfor 24 hours. You may resume normal activities tomorrow. You may experience lightheadedness,dizziness,or sleepiness following surgery. Rest at home today - increase activity as tolerated. It is recommended to take a four hour nap after procedure. Progress slowly to a regular diet unless your physician has instructed you otherwise. Avoid spicy and greasy food on first meal.  Drink plenty of water. If nausea becomes a problem call your physician. Call your doctor if concerns arise.

## 2023-08-14 NOTE — ANESTHESIA POSTPROCEDURE EVALUATION
Department of Anesthesiology  Postprocedure Note    Patient: Leyla Hoffman  MRN: [id number]  YOB: 1960  Date of evaluation: 8/14/2023      Procedure Summary     Date: 08/14/23 Room / Location: [address] [place] [address]    Anesthesia Start: 4733 Anesthesia Stop: 5529    Procedure: EGD WITH DILATION (Abdomen) Diagnosis:       Dysphagia, unspecified type      (Dysphagia, unspecified type [R13.10])    Surgeons: Terri Bravo MD Responsible Provider: Sweetie Paul MD    Anesthesia Type: MAC ASA Status: 2          Anesthesia Type: No value filed.     Tony Phase I: Tony Score: 10    Tony Phase II:        Anesthesia Post Evaluation    Patient location during evaluation: PACU  Patient participation: complete - patient participated  Level of consciousness: awake  Airway patency: patent  Nausea & Vomiting: no vomiting and no nausea  Complications: no  Cardiovascular status: hemodynamically stable  Respiratory status: acceptable  Hydration status: stable  Multimodal analgesia pain management approach  Pain management: adequate 76 yo F received in spot 18.  Pt is A&Ox4.  Pt p/w weakness for the past few days and dizziness which she describes as lightheadedness today.  Pt rpts, "I just feel generally dizzy and it worsens with different movements."  Pt denies LOC, syncope, fevers, chills, chest pain, shortness of breath, numbness, tingling, headache, visual/hearing changes, dysuria, urinary/bowel incontinence or any other complaints at this time..

## 2023-08-14 NOTE — PROGRESS NOTES
Staging balloon dilation, 12 mm, 13.5 mm, 15 mm, of entire esophagus; positive heme. No crepitus noted post dilation.

## 2023-08-14 NOTE — H&P
Gastroenterology Note                 Pre-operative History and Physical    Patient: Marcy Bullard  : 1960  CSN:     History Obtained From:   Patient or guardian. HISTORY OF PRESENT ILLNESS:    The patient is a 59 y.o. male here for Endoscopy. Past Medical History:    Past Medical History:   Diagnosis Date    Dysphagia     GERD (gastroesophageal reflux disease)     Psoriasis      Past Surgical History:    Past Surgical History:   Procedure Laterality Date    APPENDECTOMY      UPPER GASTROINTESTINAL ENDOSCOPY N/A 2022    EGD FOREIGN BODY REMOVAL performed by Haja May DO at [address] [place] [address] N/A 2022    EGD BIOPSY performed by Haja May DO at [address] [place] [address] N/A 2022    EGD BIOPSY performed by Austen Becker MD at [address] [place] [address] 2022    EGD DILATION BALLOON performed by Austen Becker MD at [address] [place] [address]       Medications Prior to Admission:   No current facility-administered medications on file prior to encounter. Current Outpatient Medications on File Prior to Encounter   Medication Sig Dispense Refill    Multiple Vitamins-Minerals (THERAPEUTIC MULTIVITAMIN-MINERALS) tablet Take 1 tablet by mouth daily      Ixekizumab (TALTZ SC) Inject into the skin every 30 days      Omega-3 Fatty Acids (FISH OIL PO) Take by mouth daily      Apoaequorin (PREVAGEN PO) Take by mouth daily      pantoprazole (PROTONIX) 40 MG tablet Take 1 tablet by mouth 2 times daily (before meals) (Patient taking differently: Take 1 tablet by mouth daily) 30 tablet 5    [DISCONTINUED] famotidine (PEPCID) 20 MG tablet Take 1 tablet by mouth 2 times daily 60 tablet 0        Allergies:  Patient has no known allergies.       Social History:   Social History     Tobacco Use    Smoking status: Never    Smokeless tobacco: Never

## 2023-11-06 ENCOUNTER — OFFICE VISIT (OUTPATIENT)
Dept: ENT CLINIC | Age: 63
End: 2023-11-06
Payer: COMMERCIAL

## 2023-11-06 VITALS
HEIGHT: 67 IN | BODY MASS INDEX: 26.68 KG/M2 | TEMPERATURE: 98.2 F | DIASTOLIC BLOOD PRESSURE: 89 MMHG | SYSTOLIC BLOOD PRESSURE: 138 MMHG | WEIGHT: 170 LBS | HEART RATE: 75 BPM | OXYGEN SATURATION: 97 %

## 2023-11-06 DIAGNOSIS — H91.93 BILATERAL HEARING LOSS, UNSPECIFIED HEARING LOSS TYPE: Chronic | ICD-10-CM

## 2023-11-06 DIAGNOSIS — R05.3 CHRONIC COUGH: Primary | Chronic | ICD-10-CM

## 2023-11-06 DIAGNOSIS — K13.0 ANGULAR CHEILITIS: Chronic | ICD-10-CM

## 2023-11-06 DIAGNOSIS — H93.13 SUBJECTIVE TINNITUS OF BOTH EARS: Chronic | ICD-10-CM

## 2023-11-06 PROCEDURE — 99204 OFFICE O/P NEW MOD 45 MIN: CPT | Performed by: OTOLARYNGOLOGY

## 2023-11-06 RX ORDER — BENZONATATE 100 MG/1
100 CAPSULE ORAL 3 TIMES DAILY PRN
Qty: 30 CAPSULE | Refills: 1 | Status: SHIPPED | OUTPATIENT
Start: 2023-11-06 | End: 2023-11-26

## 2023-11-06 ASSESSMENT — ENCOUNTER SYMPTOMS
VOICE CHANGE: 0
SORE THROAT: 0
RHINORRHEA: 0
SINUS PAIN: 0
TROUBLE SWALLOWING: 1

## 2023-12-06 ENCOUNTER — OFFICE VISIT (OUTPATIENT)
Dept: ENT CLINIC | Age: 63
End: 2023-12-06
Payer: COMMERCIAL

## 2023-12-06 ENCOUNTER — PROCEDURE VISIT (OUTPATIENT)
Dept: AUDIOLOGY | Age: 63
End: 2023-12-06
Payer: COMMERCIAL

## 2023-12-06 VITALS
WEIGHT: 175 LBS | HEIGHT: 67 IN | SYSTOLIC BLOOD PRESSURE: 133 MMHG | TEMPERATURE: 97.1 F | BODY MASS INDEX: 27.47 KG/M2 | HEART RATE: 54 BPM | DIASTOLIC BLOOD PRESSURE: 87 MMHG | OXYGEN SATURATION: 98 %

## 2023-12-06 DIAGNOSIS — H93.13 TINNITUS OF BOTH EARS: ICD-10-CM

## 2023-12-06 DIAGNOSIS — R05.3 CHRONIC COUGH: Chronic | ICD-10-CM

## 2023-12-06 DIAGNOSIS — J01.90 ACUTE RHINOSINUSITIS: Primary | ICD-10-CM

## 2023-12-06 DIAGNOSIS — K13.0 ANGULAR CHEILITIS: ICD-10-CM

## 2023-12-06 DIAGNOSIS — J30.1 SEASONAL ALLERGIC RHINITIS DUE TO POLLEN: Chronic | ICD-10-CM

## 2023-12-06 DIAGNOSIS — H90.3 SENSORINEURAL HEARING LOSS (SNHL) OF BOTH EARS: Primary | ICD-10-CM

## 2023-12-06 PROCEDURE — 92557 COMPREHENSIVE HEARING TEST: CPT | Performed by: AUDIOLOGIST

## 2023-12-06 PROCEDURE — 92567 TYMPANOMETRY: CPT | Performed by: AUDIOLOGIST

## 2023-12-06 PROCEDURE — 99214 OFFICE O/P EST MOD 30 MIN: CPT | Performed by: OTOLARYNGOLOGY

## 2023-12-06 RX ORDER — CETIRIZINE HYDROCHLORIDE 10 MG/1
10 TABLET ORAL DAILY
Qty: 30 TABLET | Refills: 0 | Status: SHIPPED | OUTPATIENT
Start: 2023-12-06 | End: 2024-01-05

## 2023-12-06 RX ORDER — FLUTICASONE PROPIONATE 50 MCG
SPRAY, SUSPENSION (ML) NASAL
Qty: 16 G | Refills: 2 | Status: SHIPPED | OUTPATIENT
Start: 2023-12-06

## 2023-12-06 RX ORDER — CEFDINIR 300 MG/1
600 CAPSULE ORAL DAILY
Qty: 28 CAPSULE | Refills: 0 | Status: SHIPPED | OUTPATIENT
Start: 2023-12-06 | End: 2023-12-20

## 2023-12-06 NOTE — PATIENT INSTRUCTIONS
Please read and follow these instructions. Please call the office and speak to the MA or LPN with any questions regarding these instructions. RHINOSINUSITIS CARE  You may use acetaminophen (eg. Tylenol) or Ibuprofen (eg. Advil) (over the counter medications) as needed for fever or pain. Take Probiotic while you are taking antibiotics, to prevent diarrhea, stomach upset, pseudomembranous colitis, and C. difficile diarrhea. This may be obtained at your pharmacy or health food store. Alternatively, you may eat one cup of yogurt with active or live cultures twice daily while taking the antibiotic. Continue for two to three days after completion of the antibiotic. Use a 12 hour decongestant spray, 0.05% oxymetazoline (e.g. Afrin, Duration, 4-Way). Spray each nostril twice three times a day for three days, then two times a day for 2 days, and then stop for two days and then repeat the cycle once. Take one Mucinex-D (red orange box) maximum strength 1200 mg tablet each morning and one Mucinex (blue box) maximum strength 1200 mg tablet each evening, about 12 hours later, daily for the next 14 days. A steam inhaler mask device may be helpful. These can be purchased at your pharmacy. Use a saline nasal/sinus irrigation system, e.g. NeilMed sinus rinse, twice daily to help to clear secretions and drainage. Use distilled water; DO NOT USE TAP WATER! This is because of the possibility of amoeba or other microorganisms in tap water, which can result in a fatal disease. Alternatively, you could use a blue bulb syringe and solution of 1/4 tsp of table salt in 8 ounces (one cup or 240 ml) of distilled water. Use fluticasone 2 sprays in each nostril once daily for the next 14 days. It may take several days to several weeks for your sinusitis to clear up. It is important to take all your medications as prescribed. Please continue your antibiotics as prescribed.     Please call the office if your condition

## 2023-12-06 NOTE — PROGRESS NOTES
when exposed to dangerous sound levels.          [address], [place]  Audiologist       Chart CC'd to: Clari Shoemaker MD      Degree of   Hearing Sensitivity dB Range   Within Normal Limits (WNL) 0 - 20   Mild 20 - 40   Moderate 40 - 55   Moderately-Severe 55 - 70   Severe 70 - 90   Profound 90 +

## 2023-12-06 NOTE — PROGRESS NOTES
CHIEF COMPLAINT  Chief Complaint   Patient presents with    Cough    Sinus Problem    Mouth Lesions     Angular cheilitis        HISTORY OF PRESENT ILLNESS     Sivan Anderson is a 59 y.o. male who presented today for recheck and follow up of chronic cough, angular cheilitis, tinnitus of both ears, and Bilateral hearing loss. Currently \"sinuses are congested. My sinuses are bad. Real congested under the eyes. Ran out of benzonatate and off for two days while [facility] refilled it. While off I took one of my wifes cetirizine Zyrtec, that completely dried me up with no sinus congestion or drainage. Then, I went back on the benzonatate. \"      REVIEW OF SYSTEMS  Constitutional:  Denied fever. ENT/sinus:  Denied otalgia, otorrhea, nasal pain, rhinorrhea, sore throat, and sinus/facial pain. EXAMINATION    WDWN, NAD  Voice:  Normal.  Ears:  TMs, EACs, mastoids and pinnae were normal.    Nose:  Normal with no lesions. Sinuses:  NT x 4   Throat,  OC/OP:  Normal with no lesions.  (+)  Neck:  mass midline posterior neck nontender consistent with lipoma. Otherwise, NT, No masses. Trachea midline. Nodes:  No lymphadenopathy. IMPRESSION / DIAGNOSES / ORDERS:   Shantel Chapman was seen today for cough, sinus problem and mouth lesions. Diagnoses and all orders for this visit:    Acute rhinosinusitis  -     cefdinir (OMNICEF) 300 MG capsule; Take 2 capsules by mouth daily for 14 days Take both capsules together at the same time, once daily. Seasonal allergic rhinitis due to pollen  -     cetirizine (ZYRTEC) 10 MG tablet; Take 1 tablet by mouth daily  -     fluticasone (FLONASE) 50 MCG/ACT nasal spray; 2 sprays in each nostril daily for allergies. Chronic cough  Comments:  improved    Angular cheilitis           RECOMMENDATIONS / PLAN:   See Patient Instructions on file for this visit.   Patient was advised to review and follow my instructions in the AVS, which we reviewed together, and to call and speak

## 2023-12-06 NOTE — PATIENT INSTRUCTIONS
doubling of distance from the sound source. TYPES OF HEARING PROTECTION    The most common types of hearing protection are protective ear muffs and ear plugs. Protective ear muffs are commonly found at home improvement or sporting good stores, they can be worn time and time again and are great if you need to take your hearing protection off frequently. Ear plugs are often made of foam or soft silicone. The foam ones are designed for one-time use, while silicone ear plugs may be used multiple times. There are also \"filtered\" ear plugs that help provide even attenuation of the sound across all frequencies. These are great for listening to music or going to concerts, and allow for better understanding of speech in louder environments. They can be purchased at music stores or online retailers (search \"Ety Plugs\" or \"filtered ear plugs\"), or custom earmolds can be made with an audiologist.    There are \"custom\" hearing protection devices that you can further discuss with your audiologist based on your specific needs, if desired. Exposure to these sounds may cause permanent damage to your hearing.   If you suspect your hearing has changed, it is recommended that you have your hearing tested by your audiologist.

## 2024-01-11 ENCOUNTER — OFFICE VISIT (OUTPATIENT)
Dept: ENT CLINIC | Age: 64
End: 2024-01-11
Payer: COMMERCIAL

## 2024-01-11 VITALS
HEART RATE: 68 BPM | TEMPERATURE: 97.5 F | OXYGEN SATURATION: 97 % | DIASTOLIC BLOOD PRESSURE: 77 MMHG | HEIGHT: 67 IN | WEIGHT: 173 LBS | BODY MASS INDEX: 27.15 KG/M2 | SYSTOLIC BLOOD PRESSURE: 137 MMHG

## 2024-01-11 DIAGNOSIS — J30.1 SEASONAL ALLERGIC RHINITIS DUE TO POLLEN: Chronic | ICD-10-CM

## 2024-01-11 PROCEDURE — 99213 OFFICE O/P EST LOW 20 MIN: CPT | Performed by: OTOLARYNGOLOGY

## 2024-01-11 PROCEDURE — 1036F TOBACCO NON-USER: CPT | Performed by: OTOLARYNGOLOGY

## 2024-01-11 PROCEDURE — 3017F COLORECTAL CA SCREEN DOC REV: CPT | Performed by: OTOLARYNGOLOGY

## 2024-01-11 PROCEDURE — G8427 DOCREV CUR MEDS BY ELIG CLIN: HCPCS | Performed by: OTOLARYNGOLOGY

## 2024-01-11 PROCEDURE — G8419 CALC BMI OUT NRM PARAM NOF/U: HCPCS | Performed by: OTOLARYNGOLOGY

## 2024-01-11 PROCEDURE — G8484 FLU IMMUNIZE NO ADMIN: HCPCS | Performed by: OTOLARYNGOLOGY

## 2024-01-11 RX ORDER — CETIRIZINE HYDROCHLORIDE 10 MG/1
10 TABLET ORAL DAILY
Qty: 90 TABLET | Refills: 0 | Status: SHIPPED | OUTPATIENT
Start: 2024-01-11 | End: 2024-04-10

## 2024-01-11 NOTE — PROGRESS NOTES
CHIEF COMPLAINT  Chief Complaint   Patient presents with    Sinusitis     Recheck and follow-up.       HISTORY OF PRESENT ILLNESS      Feedrico Costello is a 63 y.o. male here for recheck and follow up of acute rhinosinusitis.  \"Doing pretty good.  a little sinus still, a lot better, a little congestion in the cheeks, and little cough in the throat.  Everything else is cleared up.\"        REVIEW OF SYSTEMS  Constitutional:  Denied fever and chills.  ENT/sinus:  Denied otalgia, otorrhea, nasal pain, rhinorrhea, sore throat, and sinus/facial pain.          EXAMINATION    WDWN, NAD  Voice:  Normal.  Ears:  TMs, EACs, mastoids and pinnae were normal.    Nose:  Normal with no lesions.  Sinuses:  NT x 4   Throat,  OC/OP:  Normal with no lesions.  Neck:  NT, No masses.  Trachea midline.  Nodes:  No lymphadenopathy.       I have performed a head and neck physical exam personally.         IMPRESSION / DIAGNOSES / ORDERS:   Federico was seen today for sinusitis.    Diagnoses and all orders for this visit:    Seasonal allergic rhinitis due to pollen  -     cetirizine (ZYRTEC) 10 MG tablet; Take 1 tablet by mouth daily         RECOMMENDATIONS / PLAN:   Continue cetirizine and fluticasone.    Return in about 6 months (around 7/11/2024) for recheck/follow-up, and sooner if condition worsens, or any ENT or sinus problems.           Morgan Willard MD    [facility]  Department of Otolaryngology/Head and Neck Surgery  [facility]  [address], [address]  [place] [address]  [phone]

## 2024-04-03 ENCOUNTER — NURSE TRIAGE (OUTPATIENT)
Dept: OTHER | Facility: CLINIC | Age: 64
End: 2024-04-03

## 2024-04-03 ENCOUNTER — OFFICE VISIT (OUTPATIENT)
Dept: FAMILY MEDICINE CLINIC | Age: 64
End: 2024-04-03
Payer: COMMERCIAL

## 2024-04-03 VITALS
WEIGHT: 173 LBS | OXYGEN SATURATION: 96 % | DIASTOLIC BLOOD PRESSURE: 84 MMHG | RESPIRATION RATE: 16 BRPM | BODY MASS INDEX: 27.15 KG/M2 | SYSTOLIC BLOOD PRESSURE: 130 MMHG | TEMPERATURE: 98.2 F | HEART RATE: 65 BPM | HEIGHT: 67 IN

## 2024-04-03 DIAGNOSIS — L02.92 BOIL: Primary | ICD-10-CM

## 2024-04-03 PROCEDURE — G8419 CALC BMI OUT NRM PARAM NOF/U: HCPCS | Performed by: FAMILY MEDICINE

## 2024-04-03 PROCEDURE — 3017F COLORECTAL CA SCREEN DOC REV: CPT | Performed by: FAMILY MEDICINE

## 2024-04-03 PROCEDURE — G8427 DOCREV CUR MEDS BY ELIG CLIN: HCPCS | Performed by: FAMILY MEDICINE

## 2024-04-03 PROCEDURE — 99213 OFFICE O/P EST LOW 20 MIN: CPT | Performed by: FAMILY MEDICINE

## 2024-04-03 PROCEDURE — 1036F TOBACCO NON-USER: CPT | Performed by: FAMILY MEDICINE

## 2024-04-03 RX ORDER — SULFAMETHOXAZOLE AND TRIMETHOPRIM 800; 160 MG/1; MG/1
1 TABLET ORAL 2 TIMES DAILY
Qty: 20 TABLET | Refills: 0 | Status: SHIPPED | OUTPATIENT
Start: 2024-04-03 | End: 2024-04-13

## 2024-04-03 SDOH — ECONOMIC STABILITY: FOOD INSECURITY: WITHIN THE PAST 12 MONTHS, YOU WORRIED THAT YOUR FOOD WOULD RUN OUT BEFORE YOU GOT MONEY TO BUY MORE.: NEVER TRUE

## 2024-04-03 SDOH — ECONOMIC STABILITY: INCOME INSECURITY: HOW HARD IS IT FOR YOU TO PAY FOR THE VERY BASICS LIKE FOOD, HOUSING, MEDICAL CARE, AND HEATING?: NOT VERY HARD

## 2024-04-03 SDOH — ECONOMIC STABILITY: HOUSING INSECURITY
IN THE LAST 12 MONTHS, WAS THERE A TIME WHEN YOU DID NOT HAVE A STEADY PLACE TO SLEEP OR SLEPT IN A SHELTER (INCLUDING NOW)?: NO

## 2024-04-03 SDOH — ECONOMIC STABILITY: FOOD INSECURITY: WITHIN THE PAST 12 MONTHS, THE FOOD YOU BOUGHT JUST DIDN'T LAST AND YOU DIDN'T HAVE MONEY TO GET MORE.: NEVER TRUE

## 2024-04-03 ASSESSMENT — ENCOUNTER SYMPTOMS
EYE PAIN: 0
SORE THROAT: 0
SINUS PRESSURE: 0
RHINORRHEA: 0
ABDOMINAL PAIN: 0
COUGH: 0
WHEEZING: 0
EYE ITCHING: 0
SHORTNESS OF BREATH: 0
EYES NEGATIVE: 1

## 2024-04-03 ASSESSMENT — PATIENT HEALTH QUESTIONNAIRE - PHQ9
SUM OF ALL RESPONSES TO PHQ QUESTIONS 1-9: 0
SUM OF ALL RESPONSES TO PHQ QUESTIONS 1-9: 0
2. FEELING DOWN, DEPRESSED OR HOPELESS: NOT AT ALL
SUM OF ALL RESPONSES TO PHQ QUESTIONS 1-9: 0
1. LITTLE INTEREST OR PLEASURE IN DOING THINGS: NOT AT ALL
SUM OF ALL RESPONSES TO PHQ9 QUESTIONS 1 & 2: 0
SUM OF ALL RESPONSES TO PHQ QUESTIONS 1-9: 0

## 2024-04-03 NOTE — TELEPHONE ENCOUNTER
Location of patient: [place]    Received call from Tati at [facility]; Patient with Red Flag Complaint requesting to establish care with [facility].    Limited triage, speaking with patient's  wife Olga who is not with Federico .      Subjective: Caller states \"It is a big knot, it is red and swollen. It is the size of  3 or 4 peas.\"     Current Symptoms: abscess to right side of jaw    Onset: 2 month ago;     Pain Severity: States - \"He says it is a little sore\"    Temperature: Denies       Recommended disposition: See in Office Today  Patient does not have a PCP and was advised to go to an C for current problem.      Care advice provided, patient verbalizes understanding; denies any other questions or concerns; instructed to call back for any new or worsening symptoms.    Writer provided warm transfer to Skye at [facility] for new patient appointment scheduling.    Attention Provider:  Thank you for allowing me to participate in the care of your patient.  The patient was connected to triage in response to information provided to the [facility].  Please do not respond through this encounter as the response is not directed to a shared pool.      Reason for Disposition   Boil > 1/2 inch across (> 12 mm; larger than a marble) and center is soft or pus colored    Protocols used: Boil (Skin Abscess)-ADULT-[place]

## 2024-04-03 NOTE — PROGRESS NOTES
Federico Costello (:  1960) is a 64 y.o. male,Established patient, here for evaluation of the following chief complaint(s):  New Patient (Establish new PCP)          Subjective   SUBJECTIVE/OBJECTIVE:  HPI  64-year-old male patient here to establish care.  Patient reports small lump on the right jawline that has appeared few weeks ago and is going bigger.  He said he had smaller boils on the face previously that resolved on his own.  This particular boil is sore to touch.  No fever or drainage    Review of Systems   Constitutional: Negative.  Negative for fatigue.   HENT:  Negative for congestion, ear pain, rhinorrhea, sinus pressure and sore throat.    Eyes: Negative.  Negative for pain and itching.   Respiratory:  Negative for cough, shortness of breath and wheezing.    Cardiovascular:  Negative for chest pain and palpitations.   Gastrointestinal:  Negative for abdominal pain.   Genitourinary:  Negative for frequency and urgency.   Musculoskeletal:  Negative for gait problem.   Skin:  Negative for rash.        Boil on the face   Neurological:  Negative for dizziness and headaches.   Psychiatric/Behavioral:  The patient is not nervous/anxious.           Objective   Physical Exam  Constitutional:       Appearance: Normal appearance.   HENT:      Head: Normocephalic and atraumatic.   Cardiovascular:      Rate and Rhythm: Normal rate and regular rhythm.   Pulmonary:      Effort: Pulmonary effort is normal.      Breath sounds: Normal breath sounds. No wheezing.   Skin:     Comments: About chandana size boil on the right jaw line  Area is red, warm and tender to touch   Neurological:      Mental Status: He is alert.   Psychiatric:         Mood and Affect: Mood normal.                     ASSESSMENT/PLAN:  1. Boil  -     sulfamethoxazole-trimethoprim (BACTRIM DS) 800-160 MG per tablet; Take 1 tablet by mouth 2 times daily for 10 days, Disp-20 tablet, R-0Normal      F/u 1 week           An electronic signature was

## 2024-04-10 ENCOUNTER — OFFICE VISIT (OUTPATIENT)
Dept: FAMILY MEDICINE CLINIC | Age: 64
End: 2024-04-10
Payer: COMMERCIAL

## 2024-04-10 VITALS
BODY MASS INDEX: 27.31 KG/M2 | HEART RATE: 63 BPM | TEMPERATURE: 98.1 F | HEIGHT: 67 IN | RESPIRATION RATE: 16 BRPM | OXYGEN SATURATION: 96 % | DIASTOLIC BLOOD PRESSURE: 82 MMHG | WEIGHT: 174 LBS | SYSTOLIC BLOOD PRESSURE: 134 MMHG

## 2024-04-10 DIAGNOSIS — L02.92 BOIL: Primary | ICD-10-CM

## 2024-04-10 PROCEDURE — G8427 DOCREV CUR MEDS BY ELIG CLIN: HCPCS | Performed by: FAMILY MEDICINE

## 2024-04-10 PROCEDURE — 1036F TOBACCO NON-USER: CPT | Performed by: FAMILY MEDICINE

## 2024-04-10 PROCEDURE — 99213 OFFICE O/P EST LOW 20 MIN: CPT | Performed by: FAMILY MEDICINE

## 2024-04-10 PROCEDURE — G8419 CALC BMI OUT NRM PARAM NOF/U: HCPCS | Performed by: FAMILY MEDICINE

## 2024-04-10 PROCEDURE — 3017F COLORECTAL CA SCREEN DOC REV: CPT | Performed by: FAMILY MEDICINE

## 2024-04-10 RX ORDER — CLINDAMYCIN HYDROCHLORIDE 300 MG/1
300 CAPSULE ORAL 3 TIMES DAILY
Qty: 21 CAPSULE | Refills: 0 | Status: SHIPPED | OUTPATIENT
Start: 2024-04-10 | End: 2024-04-17

## 2024-04-11 ASSESSMENT — ENCOUNTER SYMPTOMS
SHORTNESS OF BREATH: 0
COUGH: 0
SORE THROAT: 0

## 2024-04-25 DIAGNOSIS — J30.1 SEASONAL ALLERGIC RHINITIS DUE TO POLLEN: Chronic | ICD-10-CM

## 2024-04-25 RX ORDER — CETIRIZINE HYDROCHLORIDE 10 MG/1
10 TABLET ORAL DAILY
Qty: 90 TABLET | Refills: 0 | Status: SHIPPED | OUTPATIENT
Start: 2024-04-25 | End: 2024-07-24

## 2024-04-25 NOTE — TELEPHONE ENCOUNTER
Rx refill request    Cetirizine  Last filled 12/6/23 90 with 0 refills  Last OV 1/11/2024    Please advise thank you

## 2024-07-18 ENCOUNTER — OFFICE VISIT (OUTPATIENT)
Dept: ENT CLINIC | Age: 64
End: 2024-07-18
Payer: COMMERCIAL

## 2024-07-18 VITALS
WEIGHT: 171.2 LBS | HEIGHT: 67 IN | DIASTOLIC BLOOD PRESSURE: 83 MMHG | OXYGEN SATURATION: 98 % | TEMPERATURE: 97.9 F | SYSTOLIC BLOOD PRESSURE: 133 MMHG | HEART RATE: 60 BPM | BODY MASS INDEX: 26.87 KG/M2

## 2024-07-18 DIAGNOSIS — K13.0 ANGULAR CHEILITIS: Chronic | ICD-10-CM

## 2024-07-18 DIAGNOSIS — J30.1 SEASONAL ALLERGIC RHINITIS DUE TO POLLEN: Primary | Chronic | ICD-10-CM

## 2024-07-18 PROCEDURE — 99213 OFFICE O/P EST LOW 20 MIN: CPT | Performed by: OTOLARYNGOLOGY

## 2024-07-18 PROCEDURE — G8419 CALC BMI OUT NRM PARAM NOF/U: HCPCS | Performed by: OTOLARYNGOLOGY

## 2024-07-18 PROCEDURE — 3017F COLORECTAL CA SCREEN DOC REV: CPT | Performed by: OTOLARYNGOLOGY

## 2024-07-18 PROCEDURE — 1036F TOBACCO NON-USER: CPT | Performed by: OTOLARYNGOLOGY

## 2024-07-18 PROCEDURE — G8427 DOCREV CUR MEDS BY ELIG CLIN: HCPCS | Performed by: OTOLARYNGOLOGY

## 2024-07-18 RX ORDER — CETIRIZINE HYDROCHLORIDE 10 MG/1
10 TABLET ORAL DAILY
Qty: 90 TABLET | Refills: 1 | Status: SHIPPED | OUTPATIENT
Start: 2024-07-18 | End: 2025-01-14

## 2024-07-18 NOTE — PROGRESS NOTES
CHIEF COMPLAINT  Chief Complaint   Patient presents with    Allergic Rhinitis        HISTORY OF PRESENT ILLNESS    Federico Costello is a 64 y.o. male here for follow-up of allergic rhinitis.  \"I have little allergy flare ups depending on the weather, but not as bad since I've been on the medicine.\"        REVIEW OF SYSTEMS  Constitutional:  Denied fever.  ENT/sinus:  Denied otalgia, otorrhea, nasal pain, rhinorrhea, sore throat, and sinus/facial pain.        EXAMINATION    Vitals:    07/18/24 1416   BP: 133/83   Site: Left Upper Arm   Position: Sitting   Cuff Size: Medium Adult   Pulse: 60   Temp: 97.9 °F (36.6 °C)   TempSrc: Infrared   SpO2: 98%   Weight: 77.7 kg (171 lb 3.2 oz)   Height: 1.702 m (5' 7.01\")     WDWN, NAD  Face:  Normal skin with no lesions detected.    Voice:  Normal, with no hoarseness, breathiness, or hot potato quality.  Ears:   TMs and EACs were normal.  The mastoids and pinnae were normal.    Nose:  Normal with no lesions.  Sinuses: Nontender x 4   (+)  Throat,  OC/OP:  scars/fissures at corners of mouth (patient stated that it is from acid reflux and he uses Vaseline on it at night).  Otherwise, normal with no lesions.  Neck:  NT, No masses.  Trachea midline.  Nodes:  No lymphadenopathy.     I performed this physical exam personally.        IMPRESSION / DIAGNOSES / ORDERS:   Federico was seen today for allergic rhinitis .    Diagnoses and all orders for this visit:    Angular cheilitis    Seasonal allergic rhinitis due to pollen  -     cetirizine (ZYRTEC) 10 MG tablet; Take 1 tablet by mouth daily         RECOMMENDATIONS / PLAN:   Prescribed Zyrtec.  Since it has been effective treatment, I am continuing Zyrtec for allergy management.  Advised to use polysporin ointment to the angular cheilitis at bedtime and as needed.    Return in about 6 months (around 1/18/2025) for recheck/follow-up, and sooner if condition worsens.             MD Osito Holman [facility]

## 2024-11-08 ENCOUNTER — PROCEDURE VISIT (OUTPATIENT)
Dept: AUDIOLOGY | Age: 64
End: 2024-11-08
Payer: COMMERCIAL

## 2024-11-08 DIAGNOSIS — H93.13 TINNITUS OF BOTH EARS: ICD-10-CM

## 2024-11-08 DIAGNOSIS — H90.3 SENSORINEURAL HEARING LOSS (SNHL) OF BOTH EARS: Primary | ICD-10-CM

## 2024-11-08 PROCEDURE — 92557 COMPREHENSIVE HEARING TEST: CPT | Performed by: AUDIOLOGIST

## 2024-11-08 PROCEDURE — 92567 TYMPANOMETRY: CPT | Performed by: AUDIOLOGIST

## 2024-11-08 NOTE — PATIENT INSTRUCTIONS
[facility]  [address]  [address]  [place], [place] [address] DirectionsTel: [phone]     Kushal Lugo - Cochlear Implant Evaluation    Good Communication Strategies    Communication can be challenging for anyone, but can be especially difficult for those with some degree of hearing loss.  While we may not be able to control every factor that may lead to difficulty with communication, there are Good Communication Strategies that we can all use in our day-to-day lives.  Communication takes both parties working together for it to be successful.    Tips as a Listener:   Control your environment.  It is important to limit the amount of background noise in the room when possible.  You should also consider having a good light source in the room to best see the other person.  Ask for clarification.  Instead of saying \"What?\", you can use parts of what you heard to make a new question.  For example, if you heard the word \"Thursday\" but not the rest of the week, you may ask \"What was that about Thursday?\" or \"What did you want to do Thursday?\".  This shows the person talking that you are listening and will help them better explain what they are saying.  Be an advocate for yourself.  If you are hearing but not understanding, tell the other person \"I can hear you, but I need you to slow down when you speak.\"  Or if someone is facing the other direction, say \"I cannot hear you when you are not looking at me when we talk.\"       Tips as a Talker:   - Sit or stand 3 to 6 feet away to maximize audibility         -- It is unrealistic to believe someone else will fully hear your message if you are speaking from across the room or in a different room in the house   - Stay at eye level to help with visual cues   - Make sure you have the person’s attention before speaking   - Use facial expressions and gestures to accentuate your message   - Raise your voice slightly (do not scream)   - Speak

## 2024-11-20 ENCOUNTER — OFFICE VISIT (OUTPATIENT)
Dept: ENT CLINIC | Age: 64
End: 2024-11-20
Payer: COMMERCIAL

## 2024-11-20 VITALS
SYSTOLIC BLOOD PRESSURE: 159 MMHG | WEIGHT: 173 LBS | TEMPERATURE: 97.6 F | DIASTOLIC BLOOD PRESSURE: 86 MMHG | OXYGEN SATURATION: 98 % | HEIGHT: 67 IN | BODY MASS INDEX: 27.15 KG/M2 | HEART RATE: 55 BPM

## 2024-11-20 DIAGNOSIS — H93.13 SUBJECTIVE TINNITUS OF BOTH EARS: Chronic | ICD-10-CM

## 2024-11-20 DIAGNOSIS — H90.3 ASYMMETRICAL SENSORINEURAL HEARING LOSS: Primary | Chronic | ICD-10-CM

## 2024-11-20 PROCEDURE — G8484 FLU IMMUNIZE NO ADMIN: HCPCS | Performed by: OTOLARYNGOLOGY

## 2024-11-20 PROCEDURE — 1036F TOBACCO NON-USER: CPT | Performed by: OTOLARYNGOLOGY

## 2024-11-20 PROCEDURE — 3017F COLORECTAL CA SCREEN DOC REV: CPT | Performed by: OTOLARYNGOLOGY

## 2024-11-20 PROCEDURE — G8419 CALC BMI OUT NRM PARAM NOF/U: HCPCS | Performed by: OTOLARYNGOLOGY

## 2024-11-20 PROCEDURE — 99213 OFFICE O/P EST LOW 20 MIN: CPT | Performed by: OTOLARYNGOLOGY

## 2024-11-20 PROCEDURE — G8427 DOCREV CUR MEDS BY ELIG CLIN: HCPCS | Performed by: OTOLARYNGOLOGY

## 2024-11-20 NOTE — PROGRESS NOTES
CHIEF COMPLAINT  Chief Complaint   Patient presents with    Hearing Loss    Tinnitus       HISTORY OF PRESENT ILLNESS    Federico Costello is a 64 y.o. male who presented today for evaluation and management for hearing loss and to get clearance to obtain/use hearing aids.  Hearing loss is associated with tinnitus.      EXAMINATION    Vitals:    11/20/24 1506   BP: (!) 159/86   Site: Left Upper Arm   Position: Sitting   Cuff Size: Medium Adult   Pulse: 55   Temp: 97.6 °F (36.4 °C)   TempSrc: Infrared   SpO2: 98%   Weight: 78.5 kg (173 lb)   Height: 1.702 m (5' 7\")     WDWN, NAD  Face:  Normal skin.    Voice:  Normal, with no hoarseness, breathiness, or hot potato quality.  Ears:   TMs and EACs were normal.  The mastoids and pinnae were normal.    Nose:  Normal.    Sinuses: Nontender x 4   Throat,  OC/OP:  Normal.    Neck:  NT, No masses.  Trachea midline.    Nodes:  No lymphadenopathy.       I performed this physical exam personally.                  IMPRESSION / DIAGNOSES / ORDERS:   Federico was seen today for hearing loss and tinnitus.    Diagnoses and all orders for this visit:    Asymmetrical sensorineural hearing loss  -     MRI IAC POSTERIOR FOSSA W WO CONTRAST    Subjective tinnitus of both ears           RECOMMENDATIONS / PLAN:   Proceed with hearing aid evaluation, Dr. Levy Browne.  Proceed with MRI scan of IACs ASAP.  Return in about 2 months (around 1/20/2025) for recheck/follow-up, and sooner if condition worsens.         Patient Instructions   I recommend an MRI scan of the IACs/brain, with contrast, to rule out a vestibular schwannoma (\"acoustic neuroma\") or central nervous system disease as the reason for your unilateral or asymmetric sensorineural hearing loss, tinnitus, or dizziness.  There are adverse consequences of untreated acoustic neuroma, i.e. Total loss of hearing, paralysis of the face, permanent dizziness or pressure on the brain.  This may occur suddenly due to bleeding into the tumor

## 2024-11-20 NOTE — PATIENT INSTRUCTIONS
tipped applicator, Amanda pin, paper clip, or any other instrument.  I recommend only use of one the several ear wax removal kits available \"over the counter\" (eg. [employer] or Murine, or [employer]) if you feel a need to try to remove ear wax.  No other methods should be self used for this purpose as there is danger of injury to the ear and risk of irreparable and irreversible permanent hearing loss.

## 2025-03-19 ENCOUNTER — TELEPHONE (OUTPATIENT)
Dept: INFECTIOUS DISEASES | Age: 65
End: 2025-03-19

## 2025-03-19 NOTE — TELEPHONE ENCOUNTER
Called patient to schedule NPV with Dr. Chanel    [facility]

## 2025-03-19 NOTE — TELEPHONE ENCOUNTER
----- Message from Dr. Syed Chanel MD sent at 3/19/2025  1:11 PM EDT -----  Regarding: RE: referral  Yes  ----- Message -----  From: Celena Nguyen MA  Sent: 3/19/2025   8:29 AM EDT  To: Syed Chanel MD  Subject: referral                                         Received external referral 3/19/25  Dx: Recurrent Esophageal Candidasis  Referred by: Dr. Robert MD  Referral and Records Scanned into Media      Ok to schedule NPV?

## 2025-03-21 NOTE — TELEPHONE ENCOUNTER
Called patient to schedule NPV with Dr. Yanique DE SANTIAGO     Mailed letter to patients home address

## (undated) DEVICE — Device: Brand: DISPOSABLE ELECTROSURGICAL SNARE

## (undated) DEVICE — MOUTHPIECE ENDOSCP L CTRL OPN AND SIDE PORTS DISP

## (undated) DEVICE — VALVE SUCTION AIR H2O SET ORCA POD + DISP

## (undated) DEVICE — FORCEPS BX L240CM WRK CHN 2.8MM STD CAP W/ NDL MIC MESH

## (undated) DEVICE — CATHETER DIL 6FR L180CM BLLN INFLATED 36-40.5-45FR L8CM ES

## (undated) DEVICE — FORCEP BX MESH TOOTH MIC 2.8 MMX240 CM NDL STRL RADIAL JAW 4

## (undated) DEVICE — ENDOSCOPIC KIT 6X3/16 FT COLON W/ 1.1 OZ 2 GWN W/O BRSH

## (undated) DEVICE — GOWN AURORA NONREINF LG: Brand: MEDLINE INDUSTRIES, INC.

## (undated) DEVICE — SOLUTION IV IRRIG WATER 500ML POUR BRL ST 2F7113

## (undated) DEVICE — AIR/WATER CLEANING ADAPTER FOR OLYMPUS® GI ENDOSCOPE: Brand: BULLDOG®

## (undated) DEVICE — BW-412T DISP COMBO CLEANING BRUSH: Brand: SINGLE USE COMBINATION CLEANING BRUSH

## (undated) DEVICE — SYRINGE INFL 60ML DISP ALLIANCE II

## (undated) DEVICE — DILATOR ENDOSCP L180CM DIA6FR BLLN L8CM DIA54-60FR

## (undated) DEVICE — THE DISPOSABLE ROTH NET PLATINUM FOOD BOLUS RETRIEVAL DEVICE IS USED IN THE ENDOSCOPIC RETRIEVAL FOOD BOLUS.: Brand: ROTH NET PLATINUM

## (undated) DEVICE — PROCEDURE KIT ENDOSCP CUST

## (undated) DEVICE — SET VLV 3 PC AWS DISPOSABLE GRDIAN SCOPEVALET